# Patient Record
Sex: MALE | Race: WHITE | NOT HISPANIC OR LATINO | URBAN - METROPOLITAN AREA
[De-identification: names, ages, dates, MRNs, and addresses within clinical notes are randomized per-mention and may not be internally consistent; named-entity substitution may affect disease eponyms.]

---

## 2021-09-15 ENCOUNTER — WEB ENCOUNTER (OUTPATIENT)
Dept: URBAN - METROPOLITAN AREA CLINIC 70 | Facility: CLINIC | Age: 21
End: 2021-09-15

## 2021-09-15 ENCOUNTER — OFFICE VISIT (OUTPATIENT)
Dept: URBAN - METROPOLITAN AREA CLINIC 70 | Facility: CLINIC | Age: 21
End: 2021-09-15
Payer: COMMERCIAL

## 2021-09-15 VITALS
HEART RATE: 101 BPM | WEIGHT: 156.4 LBS | DIASTOLIC BLOOD PRESSURE: 78 MMHG | BODY MASS INDEX: 23.16 KG/M2 | TEMPERATURE: 98.1 F | HEIGHT: 69 IN | SYSTOLIC BLOOD PRESSURE: 131 MMHG

## 2021-09-15 DIAGNOSIS — R10.32 LEFT LOWER QUADRANT PAIN: ICD-10-CM

## 2021-09-15 DIAGNOSIS — R19.7 DIARRHEA, UNSPECIFIED TYPE: ICD-10-CM

## 2021-09-15 DIAGNOSIS — R10.31 RIGHT LOWER QUADRANT PAIN: ICD-10-CM

## 2021-09-15 PROCEDURE — 99203 OFFICE O/P NEW LOW 30 MIN: CPT | Performed by: INTERNAL MEDICINE

## 2021-10-29 ENCOUNTER — OFFICE VISIT (OUTPATIENT)
Dept: URBAN - METROPOLITAN AREA CLINIC 70 | Facility: CLINIC | Age: 21
End: 2021-10-29

## 2021-10-29 ENCOUNTER — LAB OUTSIDE AN ENCOUNTER (OUTPATIENT)
Dept: URBAN - METROPOLITAN AREA CLINIC 70 | Facility: CLINIC | Age: 21
End: 2021-10-29

## 2021-11-03 LAB
ADENOVIRUS F 40/41: NOT DETECTED
ASTROVIRUS: NOT DETECTED
C DIFFICILE TOXIN A/B: NOT DETECTED
C-REACTIVE PROTEIN, QUANT: 1
CAMPYLOBACTER: NOT DETECTED
CRYPTOSPORIDIUM: NOT DETECTED
CYCLOSPORA CAYETANENSIS: NOT DETECTED
E COLI O157: (no result)
ENDOMYSIAL ANTIBODY IGA: NEGATIVE
ENTAMOEBA HISTOLYTICA: NOT DETECTED
ENTEROAGGREGATIVE E COLI: NOT DETECTED
ENTEROPATHOGENIC E COLI: NOT DETECTED
ENTEROTOXIGENIC E COLI: NOT DETECTED
GIARDIA LAMBLIA: NOT DETECTED
IMMUNOGLOBULIN A, QN, SERUM: 265
NOROVIRUS GI/GII: NOT DETECTED
PLESIOMONAS SHIGELLOIDES: NOT DETECTED
ROTAVIRUS A: NOT DETECTED
SALMONELLA: NOT DETECTED
SAPOVIRUS: NOT DETECTED
SHIGA-TOXIN-PRODUCING E COLI: NOT DETECTED
SHIGELLA/ENTEROINVASIVE E COLI: NOT DETECTED
T-TRANSGLUTAMINASE (TTG) IGA: <2
VIBRIO CHOLERAE: NOT DETECTED
VIBRIO: NOT DETECTED
YERSINIA ENTEROCOLITICA: NOT DETECTED

## 2021-12-14 ENCOUNTER — LAB OUTSIDE AN ENCOUNTER (OUTPATIENT)
Dept: URBAN - METROPOLITAN AREA CLINIC 70 | Facility: CLINIC | Age: 21
End: 2021-12-14

## 2021-12-14 ENCOUNTER — DASHBOARD ENCOUNTERS (OUTPATIENT)
Age: 21
End: 2021-12-14

## 2021-12-14 ENCOUNTER — OFFICE VISIT (OUTPATIENT)
Dept: URBAN - METROPOLITAN AREA CLINIC 70 | Facility: CLINIC | Age: 21
End: 2021-12-14
Payer: COMMERCIAL

## 2021-12-14 VITALS
WEIGHT: 157 LBS | HEART RATE: 144 BPM | DIASTOLIC BLOOD PRESSURE: 77 MMHG | SYSTOLIC BLOOD PRESSURE: 136 MMHG | TEMPERATURE: 98.4 F | BODY MASS INDEX: 23.25 KG/M2 | HEIGHT: 69 IN

## 2021-12-14 DIAGNOSIS — R19.7 DIARRHEA, UNSPECIFIED TYPE: ICD-10-CM

## 2021-12-14 PROCEDURE — 99214 OFFICE O/P EST MOD 30 MIN: CPT | Performed by: REGISTERED NURSE

## 2021-12-14 NOTE — HPI-TODAY'S VISIT:
Note from OV 9/15/21: Referral says constipation but patient reports lower abdominal pain and diarrhea x 4 months. Bowels move 2-3 times a day. No improvement with fiber or probiotics. He reports normal thyroid labs. No blood in stool. No weight loss. --------------------------- TODAY: Stool studies were negative. Celiac panel and CRP were negative. He had no improvement with fiber. No abdominal pain now but he continues to have 2-3 loose BMs/day.

## 2022-02-02 ENCOUNTER — CLAIMS CREATED FROM THE CLAIM WINDOW (OUTPATIENT)
Dept: URBAN - METROPOLITAN AREA CLINIC 4 | Facility: CLINIC | Age: 22
End: 2022-02-02
Payer: COMMERCIAL

## 2022-02-02 ENCOUNTER — OFFICE VISIT (OUTPATIENT)
Dept: URBAN - METROPOLITAN AREA SURGERY CENTER 24 | Facility: SURGERY CENTER | Age: 22
End: 2022-02-02
Payer: COMMERCIAL

## 2022-02-02 DIAGNOSIS — R19.7 DIARRHEA, UNSPECIFIED TYPE: ICD-10-CM

## 2022-02-02 DIAGNOSIS — K63.89 PNEUMATOSIS OF INTESTINES: ICD-10-CM

## 2022-02-02 PROCEDURE — 88305 TISSUE EXAM BY PATHOLOGIST: CPT | Performed by: PATHOLOGY

## 2022-02-02 PROCEDURE — G8907 PT DOC NO EVENTS ON DISCHARG: HCPCS | Performed by: INTERNAL MEDICINE

## 2022-02-02 PROCEDURE — 45380 COLONOSCOPY AND BIOPSY: CPT | Performed by: INTERNAL MEDICINE
